# Patient Record
Sex: MALE | Race: WHITE | URBAN - METROPOLITAN AREA
[De-identification: names, ages, dates, MRNs, and addresses within clinical notes are randomized per-mention and may not be internally consistent; named-entity substitution may affect disease eponyms.]

---

## 2023-02-07 ENCOUNTER — TELEPHONE (OUTPATIENT)
Dept: FAMILY MEDICINE CLINIC | Age: 43
End: 2023-02-07

## 2023-02-07 NOTE — TELEPHONE ENCOUNTER
Called pt and left a voice message I was returning his call   Advised to call back and schedule. Advised depending on provider, appointments are scheduling spring to Fall off the year.

## 2023-02-07 NOTE — TELEPHONE ENCOUNTER
----- Message from Devinnithya Lockett sent at 2/7/2023  8:39 AM EST -----  Subject: Appointment Request    Reason for Call: New Patient/New to Provider Appointment needed: New   Patient Request Appointment    QUESTIONS    Reason for appointment request? No appointments available during search     Additional Information for Provider? Patient is looking to establish care   with a provider, as his has left. He would like to see the same doctor his   wife goes to, [facility].  Please advise if patient can make an   appointment.   ---------------------------------------------------------------------------  --------------  [phone] "[employer]"  [phone]; OK to leave message on voicemail  ---------------------------------------------------------------------------  --------------  SCRIPT ANSWERS  COVID Screen: Uzma Castro